# Patient Record
Sex: MALE | Race: WHITE | NOT HISPANIC OR LATINO | Employment: FULL TIME | URBAN - METROPOLITAN AREA
[De-identification: names, ages, dates, MRNs, and addresses within clinical notes are randomized per-mention and may not be internally consistent; named-entity substitution may affect disease eponyms.]

---

## 2023-10-05 ENCOUNTER — APPOINTMENT (OUTPATIENT)
Dept: GENERAL RADIOLOGY | Facility: CLINIC | Age: 53
End: 2023-10-05
Attending: EMERGENCY MEDICINE
Payer: COMMERCIAL

## 2023-10-05 ENCOUNTER — HOSPITAL ENCOUNTER (EMERGENCY)
Facility: CLINIC | Age: 53
Discharge: HOME | End: 2023-10-06
Attending: EMERGENCY MEDICINE | Admitting: EMERGENCY MEDICINE
Payer: COMMERCIAL

## 2023-10-05 VITALS
TEMPERATURE: 98.5 F | BODY MASS INDEX: 28.49 KG/M2 | SYSTOLIC BLOOD PRESSURE: 154 MMHG | DIASTOLIC BLOOD PRESSURE: 106 MMHG | HEIGHT: 66 IN | HEART RATE: 79 BPM | WEIGHT: 177.25 LBS | OXYGEN SATURATION: 94 % | RESPIRATION RATE: 9 BRPM

## 2023-10-05 DIAGNOSIS — S82.891A CLOSED FRACTURE OF RIGHT ANKLE, INITIAL ENCOUNTER: ICD-10-CM

## 2023-10-05 PROCEDURE — 96376 TX/PRO/DX INJ SAME DRUG ADON: CPT | Mod: 59

## 2023-10-05 PROCEDURE — 96374 THER/PROPH/DIAG INJ IV PUSH: CPT

## 2023-10-05 PROCEDURE — 999N000065 XR ANKLE RIGHT 2 VIEWS: Mod: RT

## 2023-10-05 PROCEDURE — 99285 EMERGENCY DEPT VISIT HI MDM: CPT | Mod: 25

## 2023-10-05 PROCEDURE — 250N000011 HC RX IP 250 OP 636: Performed by: EMERGENCY MEDICINE

## 2023-10-05 PROCEDURE — 99152 MOD SED SAME PHYS/QHP 5/>YRS: CPT

## 2023-10-05 PROCEDURE — 27788 TREATMENT OF ANKLE FRACTURE: CPT

## 2023-10-05 PROCEDURE — 73600 X-RAY EXAM OF ANKLE: CPT | Mod: RT

## 2023-10-05 RX ORDER — MORPHINE SULFATE 4 MG/ML
4 INJECTION, SOLUTION INTRAMUSCULAR; INTRAVENOUS ONCE
Status: COMPLETED | OUTPATIENT
Start: 2023-10-05 | End: 2023-10-05

## 2023-10-05 RX ORDER — OXYCODONE HYDROCHLORIDE 5 MG/1
5 TABLET ORAL EVERY 6 HOURS PRN
Qty: 12 TABLET | Refills: 0 | Status: SHIPPED | OUTPATIENT
Start: 2023-10-05 | End: 2023-10-08

## 2023-10-05 RX ORDER — PROPOFOL 10 MG/ML
INJECTION, EMULSION INTRAVENOUS DAILY PRN
Status: COMPLETED | OUTPATIENT
Start: 2023-10-05 | End: 2023-10-05

## 2023-10-05 RX ORDER — PROPOFOL 10 MG/ML
0.25 INJECTION, EMULSION INTRAVENOUS
Status: DISCONTINUED | OUTPATIENT
Start: 2023-10-05 | End: 2023-10-06 | Stop reason: HOSPADM

## 2023-10-05 RX ORDER — PROPOFOL 10 MG/ML
1 INJECTION, EMULSION INTRAVENOUS ONCE
Status: DISCONTINUED | OUTPATIENT
Start: 2023-10-05 | End: 2023-10-06 | Stop reason: HOSPADM

## 2023-10-05 RX ORDER — SENNA AND DOCUSATE SODIUM 50; 8.6 MG/1; MG/1
1 TABLET, FILM COATED ORAL AT BEDTIME
Qty: 5 TABLET | Refills: 0 | Status: SHIPPED | OUTPATIENT
Start: 2023-10-05 | End: 2023-10-10

## 2023-10-05 RX ADMIN — MORPHINE SULFATE 4 MG: 4 INJECTION, SOLUTION INTRAMUSCULAR; INTRAVENOUS at 22:32

## 2023-10-05 RX ADMIN — MORPHINE SULFATE 4 MG: 4 INJECTION, SOLUTION INTRAMUSCULAR; INTRAVENOUS at 21:28

## 2023-10-05 RX ADMIN — PROPOFOL 20 MG: 10 INJECTION, EMULSION INTRAVENOUS at 22:26

## 2023-10-05 RX ADMIN — PROPOFOL 20 MG: 10 INJECTION, EMULSION INTRAVENOUS at 22:24

## 2023-10-05 RX ADMIN — PROPOFOL 40 MG: 10 INJECTION, EMULSION INTRAVENOUS at 22:23

## 2023-10-05 RX ADMIN — PROPOFOL 20 MG: 10 INJECTION, EMULSION INTRAVENOUS at 22:25

## 2023-10-05 ASSESSMENT — ACTIVITIES OF DAILY LIVING (ADL)
ADLS_ACUITY_SCORE: 35
ADLS_ACUITY_SCORE: 35

## 2023-10-06 NOTE — ED TRIAGE NOTES
Patient states fell hiking today.  Slipped on a boardwalk.  Happened at 10am.  Fracture & dislocation to right ankle.  Ankle rotated out.      Triage Assessment       Row Name 10/05/23 2035       Triage Assessment (Adult)    Airway WDL WDL       Respiratory WDL    Respiratory WDL WDL       Skin Circulation/Temperature WDL    Skin Circulation/Temperature WDL WDL       Cardiac WDL    Cardiac WDL WDL       Peripheral/Neurovascular WDL    Peripheral Neurovascular WDL WDL       Cognitive/Neuro/Behavioral WDL    Cognitive/Neuro/Behavioral WDL WDL

## 2023-10-06 NOTE — ED PROVIDER NOTES
"  History     Chief Complaint:  Fall and Ankle Pain       The history is provided by the patient.      Vahid Smith is a 53 year old male with history of hyperlipidemia who presents with right ankle pain after a fall while backpacking just north of [place], [place]. Patient notes that he was hiking when he fell on a board walk and his right foot got caught and awkwardly contorted at 10 AM. He was with friends who carried him back to a vehicle and brought him to urgent care. While there, he had an X-ray performed which revealed a right ankle fracture and dislocation. Due to this, he was instructed to come into the ED. Now, while sitting still he describes his pain as a 2 out of 10. Denies numbness/tingling, hitting his head, knee pain, other pain, dental damage, or blood thinner use. No history of diabetes.     Independent Historian:   None - Patient Only    Review of External Notes:   None.     Medications:    Zyloprim    Past Medical History:    Vitamin D deficiency  Hyperlipidemia  Gout  Migraine  Left inguinal hernia  Pericarditis     Past Surgical History:    Tonsillectomy  Wrist fracture repair  Left inguinal hernia repair  Vasectomy  Colonoscopy    Physical Exam   Patient Vitals for the past 24 hrs:   BP Temp Temp src Pulse Resp SpO2 Height Weight   10/05/23 2152 (!) 180/122 -- -- 82 12 96 % -- --   10/05/23 2107 (!) 173/119 -- -- 80 (!) 8 96 % -- --   10/05/23 2052 (!) 175/116 -- -- 87 20 96 % -- --   10/05/23 2037 (!) 182/130 98.5  F (36.9  C) Temporal 101 16 97 % 1.676 m (5' 6\") 80.4 kg (177 lb 4 oz)      Physical Exam  General: alert, lying still on gurney  HENT: mucous membranes moist  CV: regular rate, regular rhythm  Resp: normal effort, clear throughout, no crackles or wheezing  MSK: no bony tenderness  Skin: appropriately warm and dry  Extremities: Obvious deformity to the right lower extremity.  No laceration or abrasion.  Foot is externally rotated relative to the lower leg.  2+ DP pulse.  " Fine touch sensation grossly intact in the right lower extremity.  Able to wiggle toes without difficulty.  Right knee is nontender.  Neuro: alert, clear speech, oriented  Psych: normal mood and affect      Emergency Department Course     Imaging:  XR Ankle Right 2 Views   Final Result   IMPRESSION: Acute right ankle fracture-tibiotalar joint malalignment. Displaced fractures of the distal tibia and fibula. The talar dome is displaced posterior and lateral relative to the tibial plafond. Soft tissue swelling and deformity. Anatomically    aligned hindfoot joint spaces.      NOTE: ABNORMAL REPORT      THE DICTATION ABOVE DESCRIBES AN ABNORMALITY FOR WHICH FOLLOW-UP IS NEEDED.          Report per radiology    Procedures       Sedation     Procedure: Procedural Sedation    Sedation Level: Moderate    Indication: Fracture reduction    Consent: Written from Patient     Universal protocol: Universal protocol was followed and time out conducted just prior to starting procedure, confirming patient identity, site/side, procedure, patient position, and availability of correct equipment and implants.     Last PO Intake: 4.5 hours    ASA Class: Class 2 - A patient with mild systemic disease     Exam:  Mallampati:  Grade 1 - Soft palate, uvula, and pillars visible    Lungs: Clear   Heart: Regular rate and rhythm     Medication: Propofol  Dose: 100 mg     Monitoring:  Monitoring consisted of heart rate, cardiac, continuous pulse oximetry, frequent blood pressure checks.   There was constant attendance by RN until patient recovered and constant attendance by physician until patient stable.   Intubation and emergency airway equipment available.     Response: Vital signs stable, oxygen saturations greater than 92%       Patient Status: Post procedure patient was alert  .     Total Physician Drug Administration / Monitoring Time: 15 minutes.     Patient was monitored during recovery and returned to pre-procedure baseline.      Fracture Reduction     Procedure: Fracture Reduction     Indication: right ankle Fracture    Location: Right Ankle    Consent: Written from Patient   Risks (including but not limited to: neurologic compromise, vascular injury, pain, and inability to reduce fracture), benefits, and alternatives were discussed with patient and consent for procedure was obtained.     Universal Protocol: Universal protocol was followed and time out conducted just prior to starting procedure, confirming patient identity, site/side, procedure, patient position, and availability of correct equipment and implants.     Anesthesia/Sedation: Sedation: The patient was sedated, see separate procedure note for details.     Procedure Detail: I manually manipulated and reduced the fracture.   Immobilized with: Custom splint (See separate procedure note)  Post-reduction x-ray showed adequate reduction/not adequate reduction   Post-procedure distal neurovascular function was intact.     Patient Status:  The patient tolerated the procedure well: Yes. There were no complications.      Emergency Department Course & Assessments:       Interventions:  Medications   propofol (DIPRIVAN) injection 10 mg/mL vial (has no administration in time range)   propofol (DIPRIVAN) injection 10 mg/mL vial (has no administration in time range)   morphine (PF) injection 4 mg (4 mg Intravenous $Given 10/5/23 2128)      Assessments:  2102 I obtained history and examined the patient as noted above.     Independent Interpretation (X-rays, CTs, rhythm strip):  I reviewed the pre and post radiographs.  Patient has a unstable posterior displaced ankle fracture.  Following reduction, the talus has been reduced.  Mild persistent subluxation.    Consultations/Discussion of Management or Tests:  None        Social Determinants of Health affecting care:   None    Disposition:  The patient was discharged to home.     Impression & Plan      Medical Decision Making:  Vahid Smith  is a 53 year old male with a history of gout, who presents today with fracture involving the right ankle.  On exam, the patient has no other signs of trauma.  He is neurovascularly intact.  Fracture was reduced as above, the patient was placed in a three-way splint.  He has a unstable table fracture that will require surgical fixation.  I recommended that he be nonweightbearing, and use crutches for ambulation.  He will keep the extremity elevated.  He was sent home with a short prescription for oxycodone.  Referral was placed for [facility] orthopedics.  He has a ride home with his wife and daughter.  Return to the ED immediately for increased pain, numbness, cool or pale foot, or for other concerns.      Diagnosis:    ICD-10-CM    1. Closed fracture of right ankle, initial encounter  S82.891A Orthopedic  Referral           Discharge Medications:  Discharge Medication List as of 10/5/2023 11:52 PM        START taking these medications    Details   oxyCODONE (ROXICODONE) 5 MG tablet Take 1 tablet (5 mg) by mouth every 6 hours as needed for severe pain, Disp-12 tablet, R-0, Local Print      SENNA-docusate sodium (SENNA S) 8.6-50 MG tablet Take 1 tablet by mouth at bedtime for 5 days, Disp-5 tablet, R-0, Local Print            Scribe Disclosure:  I, Boone Yost, am serving as a scribe at 9:09 PM on 10/5/2023 to document services personally performed by Regina Guzmán MD, based on my observations and the provider's statements to me.   10/5/2023   Regina Guzmán MD Pepper, Tracy Lynn, MD  10/06/23 0151

## 2023-11-19 ENCOUNTER — HEALTH MAINTENANCE LETTER (OUTPATIENT)
Age: 53
End: 2023-11-19

## 2024-12-29 ENCOUNTER — HEALTH MAINTENANCE LETTER (OUTPATIENT)
Age: 54
End: 2024-12-29